# Patient Record
Sex: FEMALE | Race: WHITE
[De-identification: names, ages, dates, MRNs, and addresses within clinical notes are randomized per-mention and may not be internally consistent; named-entity substitution may affect disease eponyms.]

---

## 2018-01-15 ENCOUNTER — HOSPITAL ENCOUNTER (EMERGENCY)
Dept: HOSPITAL 54 - ER | Age: 63
Discharge: HOME | End: 2018-01-15
Payer: COMMERCIAL

## 2018-01-15 VITALS — WEIGHT: 130 LBS | BODY MASS INDEX: 24.55 KG/M2 | HEIGHT: 61 IN

## 2018-01-15 VITALS — SYSTOLIC BLOOD PRESSURE: 134 MMHG | DIASTOLIC BLOOD PRESSURE: 86 MMHG
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
## 2018-01-15 DIAGNOSIS — S60.212A: Primary | ICD-10-CM
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
## 2018-01-15 DIAGNOSIS — F32.9: ICD-10-CM

## 2018-01-15 DIAGNOSIS — S60.211A: ICD-10-CM
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
## 2018-01-15 PROCEDURE — Z7610: HCPCS

## 2018-01-15 PROCEDURE — 73110 X-RAY EXAM OF WRIST: CPT

## 2018-01-15 PROCEDURE — A4606 OXYGEN PROBE USED W OXIMETER: HCPCS

## 2018-01-15 PROCEDURE — 99284 EMERGENCY DEPT VISIT MOD MDM: CPT

## 2018-01-15 NOTE — NUR
61 YO FEMALE BB  AND [employer] FOR BILAT WRIST PAIN. PATIENT STATES THE PAIN 
IS FROM HANDCUFF, PATIENT IS IN CUSTODY. PATIENT ASSISTED TO ER BED, SKIN WARM 
AND DRY, RESP EVEN AND UNLABORED. WILL CONTINUE TO MONITOR

## 2018-01-15 NOTE — NUR
Patient discharged to home in stable condition. Written and verbal after care 
instructions given. Patient verbalizes understanding of instruction. PT 
ambulatory with a steady gait